# Patient Record
Sex: FEMALE
[De-identification: names, ages, dates, MRNs, and addresses within clinical notes are randomized per-mention and may not be internally consistent; named-entity substitution may affect disease eponyms.]

---

## 2024-03-08 PROBLEM — Z00.00 ENCOUNTER FOR PREVENTIVE HEALTH EXAMINATION: Status: ACTIVE | Noted: 2024-03-08

## 2024-03-13 ENCOUNTER — TRANSCRIPTION ENCOUNTER (OUTPATIENT)
Age: 39
End: 2024-03-13

## 2024-03-13 ENCOUNTER — APPOINTMENT (OUTPATIENT)
Dept: VASCULAR SURGERY | Facility: CLINIC | Age: 39
End: 2024-03-13
Payer: MEDICAID

## 2024-03-13 VITALS — SYSTOLIC BLOOD PRESSURE: 126 MMHG | DIASTOLIC BLOOD PRESSURE: 82 MMHG

## 2024-03-13 VITALS — OXYGEN SATURATION: 98 % | HEART RATE: 93 BPM

## 2024-03-13 DIAGNOSIS — Z82.49 FAMILY HISTORY OF ISCHEMIC HEART DISEASE AND OTHER DISEASES OF THE CIRCULATORY SYSTEM: ICD-10-CM

## 2024-03-13 DIAGNOSIS — Z72.0 TOBACCO USE: ICD-10-CM

## 2024-03-13 PROCEDURE — 99203 OFFICE O/P NEW LOW 30 MIN: CPT

## 2024-03-13 PROCEDURE — 93970 EXTREMITY STUDY: CPT

## 2024-03-13 NOTE — HISTORY OF PRESENT ILLNESS
[FreeTextEntry1] : 40 yo female presents for evaluation of BLE  reticular veins. Pt has had vveins of many years, recently becoming more painful. She also has swelling of BLE which is worse towards the end of the day after standing. Pt is a  and stands all day for work. Pt has been using 20-30 mmHg compression stockings for over 3 months without significant improvement in pain or swelling. Pt denies hx of DVT. Denies recent fever or chills.

## 2024-04-17 ENCOUNTER — APPOINTMENT (OUTPATIENT)
Dept: VASCULAR SURGERY | Facility: CLINIC | Age: 39
End: 2024-04-17
Payer: MEDICAID

## 2024-04-17 VITALS
SYSTOLIC BLOOD PRESSURE: 126 MMHG | BODY MASS INDEX: 32.96 KG/M2 | RESPIRATION RATE: 16 BRPM | HEIGHT: 67 IN | OXYGEN SATURATION: 98 % | HEART RATE: 72 BPM | WEIGHT: 210 LBS | DIASTOLIC BLOOD PRESSURE: 74 MMHG

## 2024-04-17 PROCEDURE — 36471 NJX SCLRSNT MLT INCMPTNT VN: CPT

## 2024-04-17 NOTE — PROCEDURE
[FreeTextEntry1] : BLE sclerotherapy  [FreeTextEntry2] : spider and reticular veins  [FreeTextEntry3] : Explained sclerotherapy procedure to patient and obtained verbal consent. All questions answered prior to procedure. 70% alcohol used to prep legs prior to injection. 1% polidocanol injected into BLE reticular veins through a 30 gauge needle. Over 20 injections into legs bilaterally. Sterile gauze and ACE wraps applied to legs bilaterally. Pt tolerated procedure well.

## 2024-04-24 ENCOUNTER — APPOINTMENT (OUTPATIENT)
Dept: VASCULAR SURGERY | Facility: CLINIC | Age: 39
End: 2024-04-24
Payer: MEDICAID

## 2024-04-24 VITALS
RESPIRATION RATE: 16 BRPM | OXYGEN SATURATION: 9 % | WEIGHT: 210 LBS | DIASTOLIC BLOOD PRESSURE: 78 MMHG | SYSTOLIC BLOOD PRESSURE: 118 MMHG | HEART RATE: 80 BPM | BODY MASS INDEX: 32.96 KG/M2 | HEIGHT: 67 IN

## 2024-04-24 DIAGNOSIS — I83.893 VARICOSE VEINS OF BILATERAL LOWER EXTREMITIES WITH OTHER COMPLICATIONS: ICD-10-CM

## 2024-04-24 PROCEDURE — 36471 NJX SCLRSNT MLT INCMPTNT VN: CPT

## 2024-04-24 NOTE — PROCEDURE
[FreeTextEntry1] : BLE sclerotherapy  [FreeTextEntry2] : symptomatic varicose and spider veins [FreeTextEntry3] : Explained sclerotherapy procedure to patient and obtained verbal consent. All questions answered prior to procedure. 70% alcohol used to prep legs prior to injection. 1% polidocanol injected into BLE reticular veins through a 30 gauge needle. Over 20 injections into legs bilaterally. Sterile gauze and ACE wraps applied to legs bilaterally. Pt tolerated procedure well.

## 2024-05-22 ENCOUNTER — APPOINTMENT (OUTPATIENT)
Dept: VASCULAR SURGERY | Facility: CLINIC | Age: 39
End: 2024-05-22

## 2024-07-26 ENCOUNTER — APPOINTMENT (OUTPATIENT)
Dept: VASCULAR SURGERY | Facility: CLINIC | Age: 39
End: 2024-07-26
Payer: MEDICAID

## 2024-07-26 VITALS
HEART RATE: 84 BPM | WEIGHT: 210 LBS | DIASTOLIC BLOOD PRESSURE: 80 MMHG | HEIGHT: 67 IN | OXYGEN SATURATION: 99 % | BODY MASS INDEX: 32.96 KG/M2 | SYSTOLIC BLOOD PRESSURE: 124 MMHG

## 2024-07-26 DIAGNOSIS — I83.893 VARICOSE VEINS OF BILATERAL LOWER EXTREMITIES WITH OTHER COMPLICATIONS: ICD-10-CM

## 2024-07-26 PROCEDURE — 36471 NJX SCLRSNT MLT INCMPTNT VN: CPT

## 2024-07-26 NOTE — PROCEDURE
[FreeTextEntry1] : BLE sclerotherapy  [FreeTextEntry2] : symptomatic spider and reticular veins  [FreeTextEntry3] : Explained sclerotherapy procedure to patient and obtained verbal consent. All questions answered prior to procedure. 70% alcohol used to prep legs prior to injection. 1% polidocanol injected into BLE reticular veins through a 30 gauge needle. Over 20 injections into legs bilaterally. Sterile gauze and ACE wraps applied to legs bilaterally. Pt tolerated procedure well.

## 2024-08-06 ENCOUNTER — APPOINTMENT (OUTPATIENT)
Dept: VASCULAR SURGERY | Facility: CLINIC | Age: 39
End: 2024-08-06

## 2024-08-06 PROCEDURE — 36471 NJX SCLRSNT MLT INCMPTNT VN: CPT | Mod: 50

## 2024-08-15 ENCOUNTER — APPOINTMENT (OUTPATIENT)
Dept: VASCULAR SURGERY | Facility: CLINIC | Age: 39
End: 2024-08-15

## 2024-08-22 ENCOUNTER — TRANSCRIPTION ENCOUNTER (OUTPATIENT)
Age: 39
End: 2024-08-22

## 2025-04-24 ENCOUNTER — APPOINTMENT (OUTPATIENT)
Dept: VASCULAR SURGERY | Facility: CLINIC | Age: 40
End: 2025-04-24